# Patient Record
Sex: FEMALE | Race: AMERICAN INDIAN OR ALASKA NATIVE
[De-identification: names, ages, dates, MRNs, and addresses within clinical notes are randomized per-mention and may not be internally consistent; named-entity substitution may affect disease eponyms.]

---

## 2019-03-30 ENCOUNTER — HOSPITAL ENCOUNTER (EMERGENCY)
Dept: HOSPITAL 5 - ED | Age: 29
Discharge: HOME | End: 2019-03-30
Payer: MEDICAID

## 2019-03-30 VITALS — SYSTOLIC BLOOD PRESSURE: 113 MMHG | DIASTOLIC BLOOD PRESSURE: 63 MMHG

## 2019-03-30 DIAGNOSIS — Z3A.01: ICD-10-CM

## 2019-03-30 DIAGNOSIS — R10.13: ICD-10-CM

## 2019-03-30 DIAGNOSIS — O21.0: Primary | ICD-10-CM

## 2019-03-30 DIAGNOSIS — O26.891: ICD-10-CM

## 2019-03-30 LAB
BILIRUB UR QL STRIP: (no result)
BLOOD UR QL VISUAL: (no result)
MUCOUS THREADS #/AREA URNS HPF: (no result) /HPF
PH UR STRIP: 5 [PH] (ref 5–7)
PROT UR STRIP-MCNC: (no result) MG/DL
RBC #/AREA URNS HPF: 2 /HPF (ref 0–6)
UROBILINOGEN UR-MCNC: < 2 MG/DL (ref ?–2)
WBC #/AREA URNS HPF: 1 /HPF (ref 0–6)

## 2019-03-30 PROCEDURE — 99283 EMERGENCY DEPT VISIT LOW MDM: CPT

## 2019-03-30 PROCEDURE — 81001 URINALYSIS AUTO W/SCOPE: CPT

## 2019-03-30 PROCEDURE — 96375 TX/PRO/DX INJ NEW DRUG ADDON: CPT

## 2019-03-30 PROCEDURE — 96361 HYDRATE IV INFUSION ADD-ON: CPT

## 2019-03-30 PROCEDURE — 96374 THER/PROPH/DIAG INJ IV PUSH: CPT

## 2019-03-30 PROCEDURE — 81025 URINE PREGNANCY TEST: CPT

## 2019-03-30 NOTE — EMERGENCY DEPARTMENT REPORT
Vomiting/Diarrhea





- HPI


Chief Complaint: Nausea/Vomiting/Diarrhea


Stated Complaint: SICK


Time Seen by Provider: 03/30/19 12:43


Duration: patient has had nausea and vomiting for approximately 1 month.  

Patient states that she has a burning sensation in epigastrium.  States she can 

keep some things down at times and sometimes she can't.


Severity: moderate


Nausea/Vomiting Severity: Moderate


Diarrhea Severity: None


Pain Location: Epigastric


Pain Severity: Mild


Symptoms: Yes Able to Tolerate Fluids (occasionally), No Watery Diarrhea, No 

Bloody diarrhea, No Fever, No Recent Unusual Foods, No Recent Untreated Water, 

No Recent use of Antibiotics, No Family w/ Similar Symptoms, No Contacts w/ Sim

ilar Symptoms, No Rash, No Hematuria, No Recent URI Symptoms





ED Review of Systems


ROS: 


Stated complaint: SICK


Other details as noted in HPI





Comment: All other systems reviewed and negative





ED Past Medical Hx





- Past Medical History


Previous Medical History?: No





- Surgical History


Past Surgical History?: No





- Social History


Smoking Status: Never Smoker


Substance Use Type: None





- Medications


Home Medications: 


                                Home Medications











 Medication  Instructions  Recorded  Confirmed  Last Taken  Type


 


Ondansetron [Zofran Odt] 4 mg PO Q8HR #10 tab.rapdis 03/30/19  Unknown Rx














Vomiting Diarrhea Exam





- Exam


General: 


Vital signs noted. No distress. Alert and acting appropriately.





HEENT: Yes Moist Mucous Membranes, No Pharyngeal Erythema, No Pharyngeal 

Exudates, No Rhinorrhea, No Conjuctival Injection, No Frontal Tenderness, No 

Maxillary Tenderness


Neck: No Adenopathy, No Rigidity


Lungs: Yes Clear Lung Sounds, Yes Good Air Exchange, No Wheezes, No Stridor, No 

Cough, No Nasal Flaring, No Retractions, No Use of Accessory Muscles


Heart exam: Regular: Yes, Murmur: No, Tachycardia: No


Abdomen: Tenderness: No, Peritoneal Signs: No, Distention: No, Hyperactive Bowel

sounds: No


Skin exam: Rash: No, Edema: No, Normal turgor: Yes


Neurologic: 


Alert and oriented, no deficits.








Musculoskeletal: 


Unremarkable.











ED Course


                                   Vital Signs











  03/30/19





  12:35


 


Temperature 98.1 F


 


Pulse Rate 71


 


Respiratory 20





Rate 


 


Blood Pressure 106/66


 


O2 Sat by Pulse 100





Oximetry 














ED Medical Decision Making





- Lab Data





                                   Lab Results











  03/30/19 Range/Units





  13:03 


 


Urine Color  Yellow  (Yellow)  


 


Urine Turbidity  Slightly-cloudy  (Clear)  


 


Urine pH  5.0  (5.0-7.0)  


 


Ur Specific Gravity  1.027  (1.003-1.030)  


 


Urine Protein  <15 mg/dl  (Negative)  mg/dL


 


Urine Glucose (UA)  Neg  (Negative)  mg/dL


 


Urine Ketones  Neg  (Negative)  mg/dL


 


Urine Blood  Neg  (Negative)  


 


Urine Nitrite  Neg  (Negative)  


 


Ur Reducing Substances  Not Reportable  


 


Urine Bilirubin  Neg  (Negative)  


 


Urine Ictotest  Not Reportable  


 


Urine Urobilinogen  < 2.0  (<2.0)  mg/dL


 


Ur Leukocyte Esterase  Neg  (Negative)  


 


Urine WBC (Auto)  1.0  (0.0-6.0)  /HPF


 


Urine RBC (Auto)  2.0  (0.0-6.0)  /HPF


 


U Epithel Cells (Auto)  24.0 H  (0-13.0)  /HPF


 


Urine Mucus  1+  /HPF


 


Urine HCG, Qual  Positive A  (Negative)  














- Medical Decision Making





Patient was found to be pregnant.  Patient has no abdominal pain at this time no

vaginal bleeding.  Patient was hydrated and does feel better regarding her 

nausea.  Patient be discharged home.


Critical care attestation.: 


If time is entered above; I have spent that time in minutes in the direct care 

of this critically ill patient, excluding procedure time.








ED Disposition


Clinical Impression: 


 Hyperemesis gravidarum





Disposition: DC-01 TO HOME OR SELFCARE


Is pt being admited?: No


Does the pt Need Aspirin: No


Condition: Stable


Instructions:  Hyperemesis Gravidarum (ED)


Referrals: 


MATTEO GALVIN MD [Staff Physician] - 3-5 Days


Time of Disposition: 15:59

## 2019-03-30 NOTE — EMERGENCY DEPARTMENT REPORT
Blank Doc





- Documentation


Documentation: 





Patient here for nausea and vomitting this morining. Feels tired. LMP february. 

No vaginal bleeding or discharge


no abdominal pain or painful urination. Feels chills





O ABdomen soft. NL BS 


alert and oriented x3


A/P


fatigue


Nausea





Urine, Hcg

## 2019-04-30 ENCOUNTER — HOSPITAL ENCOUNTER (EMERGENCY)
Dept: HOSPITAL 5 - ED | Age: 29
Discharge: HOME | End: 2019-04-30
Payer: MEDICAID

## 2019-04-30 VITALS — DIASTOLIC BLOOD PRESSURE: 54 MMHG | SYSTOLIC BLOOD PRESSURE: 139 MMHG

## 2019-04-30 DIAGNOSIS — Z3A.12: ICD-10-CM

## 2019-04-30 DIAGNOSIS — R10.30: ICD-10-CM

## 2019-04-30 DIAGNOSIS — O26.891: ICD-10-CM

## 2019-04-30 DIAGNOSIS — O21.8: Primary | ICD-10-CM

## 2019-04-30 LAB
BACTERIA #/AREA URNS HPF: (no result) /HPF
BASOPHILS # (AUTO): 0 K/MM3 (ref 0–0.1)
BASOPHILS NFR BLD AUTO: 0.3 % (ref 0–1.8)
BILIRUB UR QL STRIP: (no result)
BLOOD UR QL VISUAL: (no result)
BUN SERPL-MCNC: 4 MG/DL (ref 7–17)
BUN/CREAT SERPL: 7 %
CALCIUM SERPL-MCNC: 8.7 MG/DL (ref 8.4–10.2)
EOSINOPHIL # BLD AUTO: 0.1 K/MM3 (ref 0–0.4)
EOSINOPHIL NFR BLD AUTO: 1.6 % (ref 0–4.3)
HCT VFR BLD CALC: 38.6 % (ref 30.3–42.9)
HEMOLYSIS INDEX: 5
HGB BLD-MCNC: 12.7 GM/DL (ref 10.1–14.3)
LYMPHOCYTES # BLD AUTO: 1.1 K/MM3 (ref 1.2–5.4)
LYMPHOCYTES NFR BLD AUTO: 19.7 % (ref 13.4–35)
MCHC RBC AUTO-ENTMCNC: 33 % (ref 30–34)
MCV RBC AUTO: 77 FL (ref 79–97)
MONOCYTES # (AUTO): 0.5 K/MM3 (ref 0–0.8)
MONOCYTES % (AUTO): 8.6 % (ref 0–7.3)
MUCOUS THREADS #/AREA URNS HPF: (no result) /HPF
PH UR STRIP: 5 [PH] (ref 5–7)
PLATELET # BLD: 140 K/MM3 (ref 140–440)
PROT UR STRIP-MCNC: (no result) MG/DL
RBC # BLD AUTO: 4.99 M/MM3 (ref 3.65–5.03)
RBC #/AREA URNS HPF: 2 /HPF (ref 0–6)
UROBILINOGEN UR-MCNC: 2 MG/DL (ref ?–2)
WBC #/AREA URNS HPF: 1 /HPF (ref 0–6)

## 2019-04-30 PROCEDURE — 99284 EMERGENCY DEPT VISIT MOD MDM: CPT

## 2019-04-30 PROCEDURE — 76817 TRANSVAGINAL US OBSTETRIC: CPT

## 2019-04-30 PROCEDURE — 80048 BASIC METABOLIC PNL TOTAL CA: CPT

## 2019-04-30 PROCEDURE — 84702 CHORIONIC GONADOTROPIN TEST: CPT

## 2019-04-30 PROCEDURE — 36415 COLL VENOUS BLD VENIPUNCTURE: CPT

## 2019-04-30 PROCEDURE — 76801 OB US < 14 WKS SINGLE FETUS: CPT

## 2019-04-30 PROCEDURE — 85025 COMPLETE CBC W/AUTO DIFF WBC: CPT

## 2019-04-30 PROCEDURE — 81001 URINALYSIS AUTO W/SCOPE: CPT

## 2019-04-30 NOTE — ULTRASOUND REPORT
PROCEDURE: US OB TRANSVAGINAL 

 

HISTORY: lower abdominal pain.  No fetal movement 

 

FINDINGS: Real-time ultrasound the pelvis was performed by transabdominal and endovaginal technique, 
and demonstrates a single live intrauterine gestation at 12 weeks and 4 days with fetal cardiac activ
ity 124 bpm. There is subchorionic hemorrhage 2.4 x 1.8 x 1.1 cm. 

 

The right ovary measures 5.1 x 3.3 x 4.1 cm and contains a cyst measuring 3.5 cm. 

 

The left ovary measures 4.2 x 1.5 x 2.6 cm and appears unremarkable. 

 

There is a cyst in the vagina 2.5 x 2.2 x 2.3 cm near the cervix. This could represent a Tonja's du
ct cyst. 

 

IMPRESSION: 

Live intrauterine gestation at approximately 12 weeks and 4 days 

 

This document is electronically signed by Reid Campbell MD., April 30 2019 06:21:51 PM ET

## 2019-04-30 NOTE — EMERGENCY DEPARTMENT REPORT
HPI





- General


Chief Complaint: Urogenital-Female


Time Seen by Provider: 19 11:45





- HPI


HPI: 





Room 23





The patient is a 29-year-old female presenting with a chief complaint of lower 

abdominal pain.  The patient states she is approximately 12 weeks gestational 

age but has not yet seen an OB/GYN.  The patient states she has not felt fetal 

movement throughout the pregnancy.  Patient states she's had lower abdominal 

pain for the past 3 days.  Patient denies vaginal bleeding.  Patient admits to 

nausea and vomiting throughout the pregnancy.





Location: [See above]


Duration: [See above]


Quality:  [See above]


Severity:  [See above]


Modifying factors: [see above]


Context: [see above]


Mode of transportation: [not driving]





ED Past Medical Hx





- Past Medical History


Previous Medical History?: No





- Surgical History


Past Surgical History?: Yes


Additional Surgical History: 





- Family History


Family history: no significant





- Social History


Smoking Status: Never Smoker


Substance Use Type: None





- Medications


Home Medications: 


                                Home Medications











 Medication  Instructions  Recorded  Confirmed  Last Taken  Type


 


Ondansetron [Zofran Odt] 4 mg PO Q8HR #10 tab.rapdis 19  Unknown Rx














ED Review of Systems


ROS: 


Stated complaint: 12WKS/NO MOVEMENT


Other details as noted in HPI





Constitutional: no symptoms reported


Eyes: denies: eye pain


ENT: denies: throat pain


Respiratory: no symptoms reported


Cardiovascular: denies: chest pain


Endocrine: no symptoms reported


Gastrointestinal: abdominal pain, nausea, vomiting


Genitourinary: denies: abnormal menses


Musculoskeletal: denies: back pain


Neurological: denies: headache





Physical Exam





- Physical Exam


Vital Signs: 


                                   Vital Signs











  19





  11:08


 


Temperature 98.4 F


 


Pulse Rate 76


 


Respiratory 14





Rate 


 


Blood Pressure 102/69





[Left] 


 


O2 Sat by Pulse 100





Oximetry 











Physical Exam: 





GENERAL: The patient is well-developed well-nourished female lying on stretcher 

not appearing to be in acute distress. []


HEENT: Normocephalic.  Atraumatic.  Extraocular motions are intact.  Patient has

 moist mucous membranes.


NECK: Supple.  Trachea midline


CHEST/LUNGS: Clear to auscultation.  There is no respiratory distress noted.


HEART/CARDIOVASCULAR: Regular.  There is no tachycardia.  There is no gallop rub

 or murmur.


ABDOMEN: Abdomen is soft, with mild suprapubic discomfort to palpation.  Patient

 has normal bowel sounds.  There is no abdominal distention.


SKIN: There is no rash.  There is no edema.  There is no diaphoresis.


NEURO: The patient is awake, alert, and oriented.  The patient is cooperative.  

The patient has normal speech 


MUSCULOSKELETAL:  There is no evidence of acute injury.





ED Course


                                   Vital Signs











  19





  11:08


 


Temperature 98.4 F


 


Pulse Rate 76


 


Respiratory 14





Rate 


 


Blood Pressure 102/69





[Left] 


 


O2 Sat by Pulse 100





Oximetry 














ED Medical Decision Making





- Lab Data


Result diagrams: 


                                 19 12:08





                                 19 12:08





                                Laboratory Tests











  19





  12:08 12:08 12:29


 


WBC  5.5  


 


RBC  4.99  


 


Hgb  12.7  


 


Hct  38.6  


 


MCV  77 L  


 


MCH  26 L  


 


MCHC  33  


 


RDW  17.0 H  


 


Plt Count  140  


 


Lymph % (Auto)  19.7  


 


Mono % (Auto)  8.6 H  


 


Eos % (Auto)  1.6  


 


Baso % (Auto)  0.3  


 


Lymph #  1.1 L  


 


Mono #  0.5  


 


Eos #  0.1  


 


Baso #  0.0  


 


Seg Neutrophils %  69.8  


 


Seg Neutrophils #  3.8  


 


Sodium   133 L 


 


Potassium   4.0 


 


Chloride   100.9 


 


Carbon Dioxide   22 


 


Anion Gap   14 


 


BUN   4 L 


 


Creatinine   0.6 L 


 


Estimated GFR   > 60 


 


BUN/Creatinine Ratio   7 


 


Glucose   93 


 


Calcium   8.7 


 


Urine Color    Yellow


 


Urine Turbidity    Slightly-cloudy


 


Urine pH    5.0


 


Ur Specific Gravity    1.025


 


Urine Protein    <15 mg/dl


 


Urine Glucose (UA)    Neg


 


Urine Ketones    Neg


 


Urine Blood    Neg


 


Urine Nitrite    Neg


 


Urine Bilirubin    Neg


 


Urine Urobilinogen    2.0


 


Ur Leukocyte Esterase    Neg


 


Urine WBC (Auto)    1.0


 


Urine RBC (Auto)    2.0


 


U Epithel Cells (Auto)    16.0 H


 


Urine Bacteria (Auto)    1+


 


Urine Mucus    1+














- Differential Diagnosis


UTI, threatened , fetal demise


Critical care attestation.: 


If time is entered above; I have spent that time in minutes in the direct care 

of this critically ill patient, excluding procedure time.








ED Disposition


Clinical Impression: 


 Abdominal pain, Pregnancy





Disposition: - LEFT AGAINST MED ADVICE


Is pt being admited?: No


Does the pt Need Aspirin: No


Condition: Undetermined


Time of Disposition: 14:11 (patient leaving AMA)

## 2019-04-30 NOTE — ULTRASOUND REPORT
PROCEDURE: Limited obstetrical ultrasound. 

 

TECHNIQUE:  Real-time limited sonographic examination was performed for evaluation of each fetus with
 image documentation (1 or more fetuses).  

 

HISTORY: lower abdominal pain.  No fetal movement 

 

COMPARISONS:  None. 

 

FINDINGS: 

 

The uterus measures 11.0 cm x 7.4 cm x 10.6 cm. The myometrium is unremarkable. There is an intrauter
ine gestational sac. A fetal pole is present. The crown-rump length measurement is 6.1 cm. This indic
ates a menstrual age of 12 weeks 4 days. The estimated date of confinement is 11/8/2019. Cardiac acti
vity is documented at 164 bpm. Both ovaries appear normal in size. There is a cyst in the right ovary
 measuring 3.5 cm. 

 

IMPRESSION:   

 

Viable intrauterine pregnancy with a menstrual age of 12 weeks 4 days. 

 

This document is electronically signed by Hilton Schmid MD., April 30 2019 06:09:28 PM ET

## 2019-11-11 ENCOUNTER — HOSPITAL ENCOUNTER (INPATIENT)
Dept: HOSPITAL 5 - APU | Age: 29
LOS: 6 days | Discharge: HOME | End: 2019-11-17
Attending: OBSTETRICS & GYNECOLOGY | Admitting: OBSTETRICS & GYNECOLOGY
Payer: MEDICAID

## 2019-11-11 DIAGNOSIS — Z3A.40: ICD-10-CM

## 2019-11-11 DIAGNOSIS — Z23: ICD-10-CM

## 2019-11-11 DIAGNOSIS — O34.211: Primary | ICD-10-CM

## 2019-11-11 PROCEDURE — 36415 COLL VENOUS BLD VENIPUNCTURE: CPT

## 2019-11-11 PROCEDURE — 86901 BLOOD TYPING SEROLOGIC RH(D): CPT

## 2019-11-11 PROCEDURE — 85018 HEMOGLOBIN: CPT

## 2019-11-11 PROCEDURE — C9250 ARTISS FIBRIN SEALANT: HCPCS

## 2019-11-11 PROCEDURE — 86900 BLOOD TYPING SEROLOGIC ABO: CPT

## 2019-11-11 PROCEDURE — 85014 HEMATOCRIT: CPT

## 2019-11-11 PROCEDURE — 86850 RBC ANTIBODY SCREEN: CPT

## 2019-11-11 PROCEDURE — 85025 COMPLETE CBC W/AUTO DIFF WBC: CPT

## 2019-11-14 LAB
BASOPHILS # (AUTO): 0 K/MM3 (ref 0–0.1)
BASOPHILS NFR BLD AUTO: 0.3 % (ref 0–1.8)
EOSINOPHIL # BLD AUTO: 0 K/MM3 (ref 0–0.4)
EOSINOPHIL NFR BLD AUTO: 0.5 % (ref 0–4.3)
HCT VFR BLD CALC: 34.8 % (ref 30.3–42.9)
HGB BLD-MCNC: 11.1 GM/DL (ref 10.1–14.3)
LYMPHOCYTES # BLD AUTO: 1.3 K/MM3 (ref 1.2–5.4)
LYMPHOCYTES NFR BLD AUTO: 28.9 % (ref 13.4–35)
MCHC RBC AUTO-ENTMCNC: 32 % (ref 30–34)
MCV RBC AUTO: 74 FL (ref 79–97)
MONOCYTES # (AUTO): 0.5 K/MM3 (ref 0–0.8)
MONOCYTES % (AUTO): 10.4 % (ref 0–7.3)
PLATELET # BLD: 120 K/MM3 (ref 140–440)
RBC # BLD AUTO: 4.69 M/MM3 (ref 3.65–5.03)

## 2019-11-14 RX ADMIN — KETOROLAC TROMETHAMINE PRN MG: 30 INJECTION, SOLUTION INTRAMUSCULAR at 23:36

## 2019-11-14 RX ADMIN — DEXTROSE, SODIUM CHLORIDE, SODIUM LACTATE, POTASSIUM CHLORIDE, AND CALCIUM CHLORIDE SCH MLS/HR: 5; .6; .31; .03; .02 INJECTION, SOLUTION INTRAVENOUS at 23:36

## 2019-11-14 RX ADMIN — DEXTROSE, SODIUM CHLORIDE, SODIUM LACTATE, POTASSIUM CHLORIDE, AND CALCIUM CHLORIDE SCH MLS/HR: 5; .6; .31; .03; .02 INJECTION, SOLUTION INTRAVENOUS at 17:25

## 2019-11-14 RX ADMIN — KETOROLAC TROMETHAMINE PRN MG: 30 INJECTION, SOLUTION INTRAMUSCULAR at 17:10

## 2019-11-14 NOTE — HISTORY AND PHYSICAL REPORT
History of Present Illness


Date of examination: 19


Date of admission: 


19 09:15





Chief complaint: 





IOL for repeat csearean





Past History


Past Medical History: no pertinent history


Past Surgical History: no surgical history


Social history: .  denies: smoking, alcohol abuse, prescription drug 

abuse





- Obstetrical History


Expected Date of Delivery: 19


Actual Gestation: 40 Week(s) 3 Day(s) 


: 2


Para: 1


Hx # Term Pregnancies: 1


Number of  Pregnancies: 0


Spontaneous Abortions: 0


Induced : 0


Number of Living Children: 1





Medications and Allergies


                                    Allergies











Allergy/AdvReac Type Severity Reaction Status Date / Time


 


No Known Allergies Allergy   Unverified 19 12:23











                                Home Medications











 Medication  Instructions  Recorded  Confirmed  Last Taken  Type


 


Ondansetron [Zofran Odt] 4 mg PO Q8HR #10 tab.rapdis 19 Unknown 

Rx











Active Meds: 


Active Medications





Famotidine (Pepcid)  20 mg IV ONCE NR


   Stop: 19 13:00


   Last Admin: 19 10:46 Dose:  20 mg


   Documented by: 


Hydromorphone HCl (Dilaudid)  0.5 mg IV Q5M PRN


   PRN Reason: BREAK


   Stop: 19 11:48


Hydromorphone HCl (Dilaudid)  0.5 mg IV Q4H PRN


   PRN Reason: breakthrough pain > 7/10


Oxytocin/Sodium Chloride (Pitocin/Ns 20 Unit/1000ml Drip)  20 units in 1,000 mls

@ 0 mls/hr IV TITR LETTY


Lactated Ringer's (Lactated Ringers)  1,000 mls @ 2,250 mls/hr IV PREOP LETTY


   Stop: 11/15/19 10:27


Cefazolin Sodium (Ancef/Sterile Water 2 Gm/20 Ml)  2 gm in 20 mls @ 80 mls/hr IV

PREOP NR; Protocol


   Stop: 19 13:00


Ondansetron HCl (Zofran)  4 mg IV Q8H PRN


   PRN Reason: Nausea And Vomiting


Sodium Chloride (Sodium Chloride Flush Syringe 10 Ml)  10 ml IV PRN LETTY











Review of Systems


All systems: negative





- Physical Exam


Breasts: Positive: normal


Cardiovascular: Regular rate, Normal S1


Lungs: Positive: Clear to auscultation, Normal air movement


Abdomen: Positive: normal appearance


Genitourinary (Female): Positive: normal external genitalia, normal perenium


Vagina: Positive: normal moisture


Uterus: Positive: normal size


Anus/Rectum: Positive: normal perianal skin


Extremities: Positive: normal


Deep Tendon Reflex Grade: Normal +2





- Obstetrical


FHR: category 1





Results


Result Diagrams: 


                                 19 10:00





                              Abnormal lab results











  19 Range/Units





  10:00 


 


WBC  4.4 L  (4.5-11.0)  K/mm3


 


MCV  74 L  (79-97)  fl


 


MCH  24 L  (28-32)  pg


 


RDW  18.1 H  (13.2-15.2)  %


 


Mono % (Auto)  10.4 H  (0.0-7.3)  %








All other labs normal.








Assessment and Plan





A/P 


IUP 40+ weeks


Previosu csec -desires repeat 


HX of trich treated and neg 


hx of throbocytopenia 





IVF, labs


discused r/b/a of repeat csec which include bleeding , infection, damage to 

pelvic and non pelvic organs risk of anesthesia risk, hysterecomy and death

## 2019-11-14 NOTE — PROCEDURE NOTE
OB Delivery Note





- Delivery


Date of Delivery: 19


Surgeon: NATHAN KEITH


Estimated blood loss: other (700cc)





-  Section


Preop diagnosis: repeat 


Postop diagnosis: same


 section procedure:  section


Disposition: PACU


Complications: none


Narrative: 





see op note








- Infant


  ** A


Apgar at 1 minute: 8


Apgar at 5 minutes: 9


Infant Gender: Female (7 pounds 12.4oz)

## 2019-11-14 NOTE — ANESTHESIA DAY OF SURGERY
Anesthesia Day of Surgery





- Day of Surgery


Patient Examined: Yes


Patient H&P Reviewed: Yes


Patient is NPO: Yes


Beta Blockers: No


Cardiac Clearance: No


Pulmonary Clearance: No


Teddy's Test: N/A

## 2019-11-14 NOTE — OPERATIVE REPORT
Operative Report


Operative Report: 


DATE: 19


PREOPERATIVE DIAGNOSES:


1. Intrauterine pregnancy at 40 weeks.


2. History of previous  section x1. The patient desires a repeat 

section.





POSTOPERATIVE DIAGNOSES:


1. Intrauterine pregnancy at 39 weeks.


2. History of previous  section x1. The patient desires a repeat 

section.





PROCEDURE PERFORMED: Repeat  section





SURGEON: Dr. Radha Healy MD 





ANESTHESIA: Spinal.





ESTIMATED BLOOD LOSS: 700 mL.





COMPLICATIONS: None.





FINDINGS: Female infant in cephalic presentation with anteflexed head, Apgars 

were 8 at 1 minute and 9 at 5 minutes, and weight 7 pounds 12.4 ounces. Normal 

uterus, tubes, and ovaries were noted.





INDICATIONS: The patient is a 29-year-old  2, para 1 female, who 

presented to repeat  section at term. The patient has a history of 1 

previous  sections and she desires a repeat  section The 

procedure was described to the patient in detail including possible risks of 

bleeding, infection, injury to surrounding organs, and the possible need for 

further surgery and informed consent was obtained.





PROCEDURE NOTE: The patient was taken to the operating room where spinal 

anesthesia was administered without difficulty. The patient was prepped and 

draped in the usual sterile fashion in the dorsal supine position with a leftwar

d tilt. A Pfannenstiel skin incision was made with the scalpel and carried 

through to the underlying layer of fascia using the Bovie. The fascia was 

incised in the midline and extended laterally using Warner scissors. Kocher clamps

were used to elevate the superior aspect of the fascial incision, which was 

elevated, and the underlying rectus muscles were dissected off bluntly and using

Warner scissors. Attention was then turned to the inferior aspect of the fascial 

incision, which in similar fashion was grasped with Kocher clamps, elevated, and

the underlying rectus muscles were dissected off bluntly and using the Bovie. 

The rectus muscles were dissected in the midline.





The peritoneum was identified and entered using Metzenbaum scissors; this 

incision was extended superiorly and inferiorly with good visualization of the 

bladder. The bladder blade was inserted. The vesicouterine peritoneum was 

identified and entered sharply using Metzenbaum scissors. This incision was 

extended laterally and the bladder flap was created digitally. The bladder blade

was reinserted. The lower uterine segment was incised in a transverse fashion 

using the scalpel and extended using bandage scissors as well as manual 

traction.





Clear fluid was noted. The infant was subsequently delivered in cephalic 

presentation. . The nose and mouth were bulb suctioned. The cord was clamped and

cut. The infant was subsequently handed to the awaiting nursery nurse. The 

placenta was delivered spontaneously intact with a three-vessel cord noted. The 

uterus was exteriorized and cleared of all clots and debris. The uterine 

incision was repaired in 2 layers using 0 chromic sutures. Hemostasis was 

visualized.. The uterus was returned to the abdomen, both fallopian tubes were 

visualized and were noted to be hemostatic. The uterine incision was reexamined 

and it was noted to be hemostatic. The pelvis was copiously irrigated. The 

rectus muscles were reapproximated in the midline using 3-0 Vicryl. The fascia 

was closed with 0 PDS suture, the subcutaneous layer was closed with 3-0 plain 

gut, and the skin was closed with Carlos needle . Sponge, lap, and instrument 

counts were correct x2. The patient was stable at the completion of the 

procedure and was subsequently transferred to the recovery room in stable 

condition.

## 2019-11-15 LAB
HCT VFR BLD CALC: 30.6 % (ref 30.3–42.9)
HGB BLD-MCNC: 9.9 GM/DL (ref 10.1–14.3)

## 2019-11-15 PROCEDURE — 3E0234Z INTRODUCTION OF SERUM, TOXOID AND VACCINE INTO MUSCLE, PERCUTANEOUS APPROACH: ICD-10-PCS | Performed by: OBSTETRICS & GYNECOLOGY

## 2019-11-15 RX ADMIN — OXYCODONE AND ACETAMINOPHEN PRN TAB: 5; 325 TABLET ORAL at 16:47

## 2019-11-15 RX ADMIN — IBUPROFEN PRN MG: 800 TABLET, FILM COATED ORAL at 18:14

## 2019-11-15 RX ADMIN — SIMETHICONE PRN MG: 80 TABLET, CHEWABLE ORAL at 04:56

## 2019-11-15 RX ADMIN — IBUPROFEN PRN MG: 800 TABLET, FILM COATED ORAL at 08:50

## 2019-11-15 RX ADMIN — Medication SCH EACH: at 08:59

## 2019-11-15 RX ADMIN — HYDROCODONE BITARTRATE AND ACETAMINOPHEN PRN EACH: 5; 325 TABLET ORAL at 10:56

## 2019-11-15 RX ADMIN — HYDROCODONE BITARTRATE AND ACETAMINOPHEN PRN EACH: 5; 325 TABLET ORAL at 05:06

## 2019-11-15 RX ADMIN — FERROUS SULFATE TAB 325 MG (65 MG ELEMENTAL FE) SCH MG: 325 (65 FE) TAB at 08:59

## 2019-11-15 RX ADMIN — SIMETHICONE PRN MG: 80 TABLET, CHEWABLE ORAL at 16:46

## 2019-11-15 RX ADMIN — OXYCODONE AND ACETAMINOPHEN PRN TAB: 5; 325 TABLET ORAL at 23:16

## 2019-11-15 RX ADMIN — SIMETHICONE PRN MG: 80 TABLET, CHEWABLE ORAL at 10:57

## 2019-11-15 NOTE — PROGRESS NOTE
Assessment and Plan





A/P 


POD 1 s.p repeat csec


routine Pospartum orders


rh +


hgb 11.1-9.9


Doing well 





Subjective





- Subjective


Date of service: 11/15/19


Principal diagnosis: s/p repeat 


Patient reports: appetite normal, voiding normally, pain well controlled, 

flatus, ambulating normally


: doing well





Objective





- Vital Signs


Latest vital signs: 


                                   Vital Signs











  Temp Pulse Resp BP BP Pulse Ox


 


 11/15/19 07:55  98.9 F  94 H  18  112/72   98


 


 11/15/19 04:55  98.1 F  85  18  113/70   98


 


 11/15/19 02:21  97.8 F  85  18  113/59   99


 


 19 21:30  98.2 F  88  18  117/60   98


 


 19 17:10    20   


 


 19 16:29  97.7 F  83  20   117/61  100


 


 19 14:30   73  20  113/62   100


 


 19 14:15   60  20  113/69   92








                                Intake and Output











 11/14/19 11/15/19 11/15/19





 23:59 07:59 15:59


 


Intake Total 772.917 240 840


 


Output Total 200 600 


 


Balance 572.917 -360 840


 


Intake:   


 


  .917  


 


    D5lr 1,000 ml @ 125 mls/ 772.917  





    hr IV AS DIRECT LETTY Rx#:   





    [id number]   


 


  Oral   360


 


  Intake, Free Water  240 480


 


Output:   


 


  Urine 200 600 


 


    Indwelling Catheter 200 600 


 


Other:   


 


  Total, Intake Amount   360


 


  Total, Output Amount 200 600 


 


  # Voids   


 


    Indwelling Catheter  2 3


 


    Void  1 


 


  Estimated Blood Loss 700  














- Exam


Breasts: Present: normal


Cardiovascular: Present: Regular rate, Normal S1


Lungs: Present: Clear to auscultation, Normal air movement


Abdomen: Present: normal appearance, soft, normal bowel sounds.  Absent: 

distention, tenderness, guarding


Uterus: Present: normal, firm, fundal height below umbilicus.  Absent: 

bogginess, tenderness


Extremities: Present: normal


Deep Tendon Reflex Grade: Normal +2


Incision: Present: normal, dry, intact





- Labs


Labs: 


                              Abnormal lab results











  11/15/19 Range/Units





  00:18 


 


Hgb  9.9 L  (10.1-14.3)  gm/dl

## 2019-11-16 RX ADMIN — OXYCODONE AND ACETAMINOPHEN PRN TAB: 5; 325 TABLET ORAL at 20:45

## 2019-11-16 RX ADMIN — IBUPROFEN PRN MG: 800 TABLET, FILM COATED ORAL at 23:06

## 2019-11-16 RX ADMIN — Medication SCH EACH: at 10:01

## 2019-11-16 RX ADMIN — FERROUS SULFATE TAB 325 MG (65 MG ELEMENTAL FE) SCH MG: 325 (65 FE) TAB at 10:01

## 2019-11-16 RX ADMIN — IBUPROFEN PRN MG: 800 TABLET, FILM COATED ORAL at 01:28

## 2019-11-16 RX ADMIN — HYDROCODONE BITARTRATE AND ACETAMINOPHEN PRN EACH: 5; 325 TABLET ORAL at 15:57

## 2019-11-16 NOTE — PROGRESS NOTE
Assessment and Plan





A/P 


POD 2 s.p repeat csec


routine Postpartum orders


rh +


hgb 11.1-9.9


Doing well


d/c home tomorrow  





Subjective





- Subjective


Date of service: 19


Principal diagnosis: s/p repeat 


Patient reports: appetite normal, voiding normally, pain well controlled, 

flatus, ambulating normally


: doing well





Objective





- Vital Signs


Latest vital signs: 


                                   Vital Signs











  Temp Pulse Resp BP Pulse Ox


 


 19 07:53  97.9 F  86  18  112/68  98


 


 19 01:31  98.1 F  83  20  108/73  99


 


 11/15/19 17:49  98.6 F  87  18  115/79  99








                                Intake and Output











 11/15/19 11/16/19 11/16/19





 23:59 07:59 15:59


 


Intake Total 840 480 240


 


Balance 840 480 240


 


Intake:   


 


  Oral 480 480 240


 


  Intake, Free Water 360  


 


Other:   


 


  Total, Intake Amount 120 240 240


 


  # Voids   


 


    Indwelling Catheter 2 1 


 


    Void  1 1














- Exam


Breasts: Present: normal


Cardiovascular: Present: Regular rate, Normal S1


Lungs: Present: Clear to auscultation, Normal air movement


Abdomen: Present: normal appearance, soft, normal bowel sounds.  Absent: 

distention, tenderness, guarding


Uterus: Present: normal, firm, fundal height below umbilicus.  Absent: bogginess

, tenderness


Extremities: Present: normal


Deep Tendon Reflex Grade: Normal +2


Incision: Present: normal, dry, intact

## 2019-11-17 VITALS — DIASTOLIC BLOOD PRESSURE: 83 MMHG | SYSTOLIC BLOOD PRESSURE: 120 MMHG

## 2019-11-17 RX ADMIN — Medication SCH EACH: at 10:09

## 2019-11-17 RX ADMIN — IBUPROFEN PRN MG: 800 TABLET, FILM COATED ORAL at 05:33

## 2019-11-17 RX ADMIN — FERROUS SULFATE TAB 325 MG (65 MG ELEMENTAL FE) SCH MG: 325 (65 FE) TAB at 10:09

## 2019-11-17 NOTE — DISCHARGE SUMMARY
Providers





- Providers


Date of Admission: 


19 09:15





Date of discharge: 19


Attending physician: 


DHRUV LINDQUIST





Primary care physician: 


DHRUV LINDQUIST








Hospitalization


Reason for admission:  section


Delivery: 


Procedure:  section, repeat low transverse


Episiotomy: none


Laceration: none


Incision: normal, dry, intact


Other postpartum procedures: none


Postpartum complications: none


Discharge diagnosis: IUP at term delivered


[place] baby: female


Hospital course: 





Patient admitted and had a repeat csec. Patient did well Postop and discharged 

home with viable female on PPD3


Condition at discharge: Good


Disposition: DC-01 TO HOME OR SELFCARE





Plan





- Discharge Medications


Prescriptions: 


Ferrous Sulfate [Feosol 325 MG tab] 325 mg PO BID #60 tablet


Ibuprofen [Motrin] 600 mg PO Q8H PRN #30 tablet


 PRN Reason: Pain


oxyCODONE /ACETAMINOPHEN [Percocet 5/325] 1 tab PO Q6HR PRN #30 tablet


 PRN Reason: Pain





- Provider Discharge Summary


Activity: routine, no sex for 6 weeks, no strenuous exercise


Diet: routine


Instructions: routine


Additional instructions: 


[]  Smoking cessation referral if applicable(refer to patient education folder 

for contact #)


[]  Refer to [facility]'s [facility] Booklet








Call your doctor immediately for:


* Fever > 100.5


* Heavy vaginal bleeding ( >1 pad per hour)


* Severe persistent headache


* Shortness of breath


* Reddened, hot, painful area to leg or breast


* Drainage or odor from incision.





* Keep incision clean and dry at all times and follow doctor's instructions 

regarding bathing/showering











- Follow up plan


Follow up: 


DHRUV LINDQUIST MD [Primary Care Provider] - 14 Days


Forms:  [facility] Discharge Summary

## 2019-11-17 NOTE — PROGRESS NOTE
Assessment and Plan





A/P 


POD 3 s.p repeat csec


routine Postpartum orders


rh +


hgb 11.1-9.9


Doing well


d/c home today 





Subjective





- Subjective


Date of service: 19


Principal diagnosis: s/p repeat 


Patient reports: appetite normal, voiding normally, pain well controlled, 

flatus, ambulating normally


Lambert: doing well





Objective





- Vital Signs


Latest vital signs: 


                                   Vital Signs











  Temp Pulse Resp BP BP Pulse Ox


 


 19 07:41  98.1 F  83  18  111/74   100


 


 19 00:05  98.1 F  79  20   112/64  98


 


 19 16:04  98.1 F  75  18  118/77   99








                                Intake and Output











 19





 23:59 07:59 15:59


 


Intake Total 840 480 


 


Balance 840 480 


 


Intake:   


 


  Oral 600 480 


 


  Intake, Free [place] 240  


 


Other:   


 


  Total, Intake Amount 240 240 


 


  # Voids   


 


    Void 1 1 














- Exam


Breasts: Present: normal


Cardiovascular: Present: Regular rate, Normal S1


Lungs: Present: Clear to auscultation, Normal air movement


Abdomen: Present: normal appearance, soft, normal bowel sounds.  Absent: 

distention, tenderness, guarding


Vulva: both: normal


Uterus: Present: normal, firm, fundal height below umbilicus.  Absent: bogg

iness, tenderness


Extremities: Present: normal


Deep Tendon Reflex Grade: Normal +2


Incision: Present: normal, dry, intact